# Patient Record
Sex: FEMALE | Race: WHITE | NOT HISPANIC OR LATINO | Employment: FULL TIME | URBAN - METROPOLITAN AREA
[De-identification: names, ages, dates, MRNs, and addresses within clinical notes are randomized per-mention and may not be internally consistent; named-entity substitution may affect disease eponyms.]

---

## 2022-11-15 ENCOUNTER — OFFICE VISIT (OUTPATIENT)
Dept: PODIATRY | Facility: CLINIC | Age: 47
End: 2022-11-15
Payer: COMMERCIAL

## 2022-11-15 VITALS
WEIGHT: 244.81 LBS | BODY MASS INDEX: 37.1 KG/M2 | DIASTOLIC BLOOD PRESSURE: 94 MMHG | HEIGHT: 68 IN | SYSTOLIC BLOOD PRESSURE: 138 MMHG | HEART RATE: 82 BPM

## 2022-11-15 DIAGNOSIS — M79.672 LEFT FOOT PAIN: ICD-10-CM

## 2022-11-15 DIAGNOSIS — E11.9 COMPREHENSIVE DIABETIC FOOT EXAMINATION, TYPE 2 DM, ENCOUNTER FOR: ICD-10-CM

## 2022-11-15 DIAGNOSIS — M72.2 PLANTAR FASCIITIS: Primary | ICD-10-CM

## 2022-11-15 PROCEDURE — 99203 PR OFFICE/OUTPT VISIT, NEW, LEVL III, 30-44 MIN: ICD-10-PCS | Mod: S$GLB,,, | Performed by: PODIATRIST

## 2022-11-15 PROCEDURE — 99203 OFFICE O/P NEW LOW 30 MIN: CPT | Mod: S$GLB,,, | Performed by: PODIATRIST

## 2022-11-15 RX ORDER — SEMAGLUTIDE 1.34 MG/ML
1 INJECTION, SOLUTION SUBCUTANEOUS
COMMUNITY
Start: 2022-07-22

## 2022-11-15 RX ORDER — SERTRALINE HYDROCHLORIDE 25 MG/1
25 TABLET, FILM COATED ORAL NIGHTLY
COMMUNITY
Start: 2022-09-15

## 2022-11-15 RX ORDER — DEXTROAMPHETAMINE SACCHARATE, AMPHETAMINE ASPARTATE MONOHYDRATE, DEXTROAMPHETAMINE SULFATE AND AMPHETAMINE SULFATE 7.5; 7.5; 7.5; 7.5 MG/1; MG/1; MG/1; MG/1
CAPSULE, EXTENDED RELEASE ORAL NIGHTLY PRN
COMMUNITY
Start: 2022-10-19

## 2022-11-15 RX ORDER — SERTRALINE HYDROCHLORIDE 100 MG/1
100 TABLET, FILM COATED ORAL NIGHTLY
COMMUNITY
Start: 2022-09-15

## 2022-11-15 RX ORDER — TRAZODONE HYDROCHLORIDE 50 MG/1
50-100 TABLET ORAL NIGHTLY PRN
COMMUNITY
Start: 2022-09-15

## 2022-11-15 RX ORDER — METFORMIN HYDROCHLORIDE 500 MG/1
500 TABLET, EXTENDED RELEASE ORAL 2 TIMES DAILY
COMMUNITY
Start: 2022-07-22

## 2022-11-15 NOTE — PROGRESS NOTES
Subjective:      Patient ID: Christina Dodd is a 47 y.o. female.    Chief Complaint: Foot Pain    Christina is a 47 y.o. female who presents to the clinic complaining of heel pain in the left foot, especially with the first step in the morning. The pain is described as Burning, Throbbing, Grabbing, and Tight. The onset of the pain was gradual and has worsened slightly over the past several weeks. Christina rates the pain as 5/10. She denies a history of trauma. Prior treatments include change in shoe gear and occasional stretching.    Review of Systems   Constitutional: Negative for chills and fever.   Cardiovascular:  Negative for chest pain and leg swelling.   Respiratory:  Negative for cough and shortness of breath.    Gastrointestinal:  Negative for diarrhea, nausea and vomiting.         Objective:      Physical Exam  Vitals reviewed.   Constitutional:       General: She is not in acute distress.     Appearance: Normal appearance. She is not ill-appearing.   HENT:      Nose: Nose normal.   Cardiovascular:      Rate and Rhythm: Normal rate.   Pulmonary:      Effort: Pulmonary effort is normal. No respiratory distress.   Skin:     Capillary Refill: Capillary refill takes 2 to 3 seconds.   Neurological:      Mental Status: She is alert and oriented to person, place, and time.   Psychiatric:         Mood and Affect: Mood normal.         Behavior: Behavior normal.         Thought Content: Thought content normal.         Judgment: Judgment normal.     Neurologic: Protective and light touch sensation intact bilateral lower extremity   Musculoskeletal:  5/5 muscle strength noted bilateral foot, ankle joint range of motion is mildly reduced with mild pain, tenderness left foot and pain with palpation plantar aspect of the left heel at the lateral condyle of the calcaneus less tenderness with palpation at medial calcaneal tubercle, left foot  Vascular: DP and PT pulses palpable 2/4 bilateral foot, no edema noted bilateral foot,  capillary fill time less 3 seconds to distal aspect of the digits bilateral foot, skin temperature gradient within normal limits from proximal distal bilateral foot   Dermatologic:  No open lesions noted bilateral foot, no rashes noted bilateral foot, no interdigital maceration noted bilateral foot, nails 1 through 5 bilateral within normal limits of thickness and color        Assessment:       Encounter Diagnoses   Name Primary?    Plantar fasciitis Yes    Left foot pain     Comprehensive diabetic foot examination, type 2 DM, encounter for          Plan:       Christina was seen today for foot pain.    Diagnoses and all orders for this visit:    Plantar fasciitis    Left foot pain    Comprehensive diabetic foot examination, type 2 DM, encounter for      I counseled the patient on her conditions, their implications and medical management.        1. Patient was examined and evaluated   2. Discussed with patient etiology of plantar fasciitis.  Patient was advised of proper stretching exercises.  Stretching exercises were discussed in detail.  Stretching exercises were then printed and dispensed to patient for home reference.  Discussed with patient OTC Powerstep inserts with break-in period.  Patient will continue with comfortable shoe gear both inside and outside the home.  Patient was advised to adjunct with OTC NSAIDs for pain relief.  Discussed possible progression to local steroid versus oral Medrol Dosepak therapy for improvement of symptoms, should they persist or worsen over time   3. Patient was advised to perform daily foot monitoring.  Patient will continue with efforts at proper glycemic control, lowering hemoglobin A1c, and adherence to diabetic medication regimen  4. Patient will follow-up in 1 month or p.r.n. for complaints